# Patient Record
(demographics unavailable — no encounter records)

---

## 2025-02-03 NOTE — CARDIOLOGY SUMMARY
[de-identified] : 1/22/24 SR with first degree AV block  10/4/21 SR @ 69 bpm  7/25/2022  sinus at 74bpm  12/12/22 SR with first degree AV block   [de-identified] : Homero 8/7 - 8/23/22: NSR with non sustained SVT, rare ectopy  [___] : [unfilled]

## 2025-02-03 NOTE — REVIEW OF SYSTEMS
[Fever] : no fever [Chills] : no chills [Feeling Fatigued] : not feeling fatigued [SOB] : no shortness of breath [Dyspnea on exertion] : not dyspnea during exertion [Chest Discomfort] : no chest discomfort [Palpitations] : no palpitations [Orthopnea] : no orthopnea [Syncope] : no syncope [Cough] : no cough [Wheezing] : no wheezing [Negative] : Heme/Lymph

## 2025-02-03 NOTE — ADDENDUM
[FreeTextEntry1] : I, Kaleigh Merlos, hereby attest that the medical record entry for this patient accurately reflects signatures/notations that I made on the Date of Service in my capacity as an Attending Physician when I treated/diagnosed the above patient. I do hereby attest that this information is true, accurate and complete to the best of my knowledge.  I was present for the entire visit and supervised the entire visit and made/agree with the plan as outlined.\par

## 2025-02-03 NOTE — DISCUSSION/SUMMARY
[FreeTextEntry1] : Mr. Gonzalez is a 84 year-old male with type II DM and typical atrial flutter s/p ablation 3/21/19.  His CHADSVASC is at least 2 and we discussed that he is at risk of developing atrial fibrillation.    1. AFlutter Maintaining SR on ECG. He is using a smart watch to monitor heart rhythm.  Discussed option of ILR for long-term heart rhythm assessment.  He continues to decline.  Prefers to continue OAC as he is not having any bleeding issues.    2.  Risk modification Continue OAC for TE/CVA prophylaxis  F/U 6 months, sooner as needed.

## 2025-02-03 NOTE — PHYSICAL EXAM
[Well Developed] : well developed [Well Nourished] : well nourished [No Acute Distress] : no acute distress [Normal S1, S2] : normal S1, S2 [Clear Lung Fields] : clear lung fields [Good Air Entry] : good air entry [No Respiratory Distress] : no respiratory distress  [Soft] : abdomen soft [Normal Gait] : normal gait [No Edema] : no edema [No Rash] : no rash [Moves all extremities] : moves all extremities [No Focal Deficits] : no focal deficits [Alert and Oriented] : alert and oriented [No Oral Cyanosis] : no oral cyanosis

## 2025-02-03 NOTE — HISTORY OF PRESENT ILLNESS
[FreeTextEntry1] : Mr. Gonzlaez is an 84 year-old male with type II diabetes and typical atrial flutter s/p RFA of the CTI on 3/21/19.  Prior ECG at Dr. Bourgeois's was concerning for a junctional rhythm.  Review of ECG consistent with likely ectopic atrial rhythm with prolonged CATRACHITO.  He presents for routine follow up.  He continues to feel very well and offers no acute complaints.  No active chest pain, palpitations, SOB at rest, LE edema, orthopnea, PND, palpitations, and syncope.  He reports compliance with his medications, including Eliquis twice daily.  He reports minor infrequent nose bleeds due to changing weather but no major bleeding.   LTM 6/17 - 6/30/19: SR (HR range 43-), No AFib, PVC burden 7%) His EKG toady shows NSR with first deg AVB unchanged from prior.

## 2025-02-03 NOTE — CARDIOLOGY SUMMARY
[de-identified] : 1/22/24 SR with first degree AV block  10/4/21 SR @ 69 bpm  7/25/2022  sinus at 74bpm  12/12/22 SR with first degree AV block   [de-identified] : Homero 8/7 - 8/23/22: NSR with non sustained SVT, rare ectopy  [___] : [unfilled]

## 2025-02-18 NOTE — HISTORY OF PRESENT ILLNESS
[FreeTextEntry1] : Mr. Gonzalez is an 84 year-old male with type II diabetes and typical atrial flutter s/p RFA of the CTI on 3/21/19.  Prior ECG at Dr. Bourgeois's was concerning for a junctional rhythm.  Review of ECG consistent with likely ectopic atrial rhythm with prolonged CATRACHITO.  He presents for routine follow up.  He continues to feel very well and offers no acute complaints.  No active chest pain, palpitations, SOB at rest, LE edema, orthopnea, PND, palpitations, and syncope.  He reports compliance with his medications, including Eliquis twice daily.  He reports minor infrequent nose bleeds due to changing weather but no major bleeding.   LTM 6/17 - 6/30/19: SR (HR range 43-), No AFib, PVC burden 7%) His EKG toady shows NSR with first deg AVB unchanged from prior.    no